# Patient Record
Sex: FEMALE | Race: WHITE | URBAN - METROPOLITAN AREA
[De-identification: names, ages, dates, MRNs, and addresses within clinical notes are randomized per-mention and may not be internally consistent; named-entity substitution may affect disease eponyms.]

---

## 2018-11-30 ENCOUNTER — OFFICE VISIT (OUTPATIENT)
Dept: URBAN - METROPOLITAN AREA CLINIC 71 | Facility: CLINIC | Age: 78
End: 2018-11-30
Payer: COMMERCIAL

## 2018-11-30 DIAGNOSIS — H43.813 VITREOUS DEGENERATION, BILATERAL: ICD-10-CM

## 2018-11-30 PROCEDURE — 99204 OFFICE O/P NEW MOD 45 MIN: CPT | Performed by: OPTOMETRIST

## 2018-11-30 ASSESSMENT — INTRAOCULAR PRESSURE
OD: 20
OS: 20

## 2018-11-30 NOTE — IMPRESSION/PLAN
Impression: Dry eye syndrome of bilateral lacrimal glands: H04.123. likely cause of fluctuating blur. No PCO OU. Open PC OU. Eyes worse in AM. Plan: Blink more. ATs QID OU. Genteal Gel Severe. Call if worsens.

## 2018-11-30 NOTE — IMPRESSION/PLAN
Impression: Vitreous degeneration, bilateral: H43.813. Syneresis may also cause vision problems Plan: Discussed. No tx recommended.  observe

## 2020-07-16 ENCOUNTER — OFFICE VISIT (OUTPATIENT)
Dept: URBAN - METROPOLITAN AREA CLINIC 71 | Facility: CLINIC | Age: 80
End: 2020-07-16
Payer: MEDICARE

## 2020-07-16 DIAGNOSIS — H11.32 SUBCONJUNCTIVAL HEMORRHAGE OF LEFT EYE: Primary | ICD-10-CM

## 2020-07-16 DIAGNOSIS — Z96.1 PRESENCE OF INTRAOCULAR LENS: ICD-10-CM

## 2020-07-16 DIAGNOSIS — H04.123 DRY EYE SYNDROME OF BILATERAL LACRIMAL GLANDS: ICD-10-CM

## 2020-07-16 PROCEDURE — 92014 COMPRE OPH EXAM EST PT 1/>: CPT | Performed by: OPTOMETRIST

## 2020-07-16 ASSESSMENT — INTRAOCULAR PRESSURE
OD: 17
OS: 17

## 2020-07-16 NOTE — IMPRESSION/PLAN
Impression: Dry eye syndrome of bilateral lacrimal glands: H04.123. likely cause of fluctuating blur. Eyes worse in AM. Plan: Blink more. ATs QID OU. Genteal Gel Severe. Call if worsens. Consider sleeping mask/goggles.

## 2022-04-25 ENCOUNTER — OFFICE VISIT (OUTPATIENT)
Dept: URBAN - METROPOLITAN AREA CLINIC 71 | Facility: CLINIC | Age: 82
End: 2022-04-25
Payer: MEDICARE

## 2022-04-25 DIAGNOSIS — H16.213 EXPOSURE KERATOCONJUNCTIVITIS, BILATERAL: Primary | ICD-10-CM

## 2022-04-25 DIAGNOSIS — H43.813 VITREOUS DEGENERATION, BILATERAL: ICD-10-CM

## 2022-04-25 DIAGNOSIS — Z96.1 PRESENCE OF INTRAOCULAR LENS: ICD-10-CM

## 2022-04-25 PROCEDURE — 92014 COMPRE OPH EXAM EST PT 1/>: CPT | Performed by: OPTOMETRIST

## 2022-04-25 ASSESSMENT — INTRAOCULAR PRESSURE
OS: 22
OD: 18

## 2022-04-25 NOTE — IMPRESSION/PLAN
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 OS>OD. Likely cause of fluctuating blur. Eyes worse in AM, or when reading for extended period. Plan: Discussed good blinking habits, especially with extended near/computer work. Use ATs up to QID OU. Recommend Genteal Gel Severe before bed OU. Consider sleeping mask/goggles. Recommend Tranquileyes or similar sleeping mask. Call if worsens.

## 2022-04-25 NOTE — IMPRESSION/PLAN
Impression: Vitreous degeneration, bilateral: H43.813. Stable OU, no holes or tears observed on today's exam Plan: Discussed. No tx recommended.  Continue to monitor with yearly DE.

## 2022-04-25 NOTE — IMPRESSION/PLAN
Impression: Presence of intraocular lens: Z96.1. s/p YAG OU. Restor IOL OU Plan: Continue to observe.

## 2023-04-27 ENCOUNTER — OFFICE VISIT (OUTPATIENT)
Dept: URBAN - METROPOLITAN AREA CLINIC 71 | Facility: CLINIC | Age: 83
End: 2023-04-27
Payer: MEDICARE

## 2023-04-27 DIAGNOSIS — Z96.1 PRESENCE OF INTRAOCULAR LENS: ICD-10-CM

## 2023-04-27 DIAGNOSIS — H16.213 EXPOSURE KERATOCONJUNCTIVITIS, BILATERAL: ICD-10-CM

## 2023-04-27 DIAGNOSIS — H40.013 OPEN ANGLE WITH BORDERLINE FINDINGS, LOW RISK, BILATERAL: ICD-10-CM

## 2023-04-27 DIAGNOSIS — H43.813 VITREOUS DEGENERATION, BILATERAL: Primary | ICD-10-CM

## 2023-04-27 PROCEDURE — 76514 ECHO EXAM OF EYE THICKNESS: CPT | Performed by: OPTOMETRIST

## 2023-04-27 PROCEDURE — 92133 CPTRZD OPH DX IMG PST SGM ON: CPT | Performed by: OPTOMETRIST

## 2023-04-27 PROCEDURE — 92014 COMPRE OPH EXAM EST PT 1/>: CPT | Performed by: OPTOMETRIST

## 2023-04-27 ASSESSMENT — INTRAOCULAR PRESSURE
OD: 15
OS: 13

## 2023-04-27 NOTE — IMPRESSION/PLAN
Impression: Vitreous degeneration, bilateral: H43.813. PVD stable OU, no holes or tears observed on today's exam. Plan: Discussed. Still no tx recommended.  Continue to monitor with yearly DE.

## 2023-04-27 NOTE — IMPRESSION/PLAN
Impression: Open angle with borderline findings, low risk, bilateral: H40.013. Large CD's and overall nerve appearance OU. Pachs: mildly thin OU. Baseline OCT 04/27/23: thinning OD, WNL OS. Plan: OCT-RNFL and Pachymetry ordered and performed today. Discussed condition in detail. Continue to monitor without treatment at this time. Will have pt return next available for VF 24-2 and IOP check with Dr. Ari Hartley, then repeat OCT/VF in 6 months. May need to initiate treatment at that time. Discussed importance of compliance if treatment is started, and risk of vision loss without proper control.

## 2023-04-27 NOTE — IMPRESSION/PLAN
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 Incomplete blink OU. Likely cause of fluctuating blur. Eyes worse in AM, or when reading for extended period. Plan: Reviewed good blinking habits, especially with extended near/computer work. Use ATs up to QID OU. Recommend Genteal Gel Severe before bed OU. Continue Tranquileyes sleeping mask/goggles. Call if worsens.

## 2023-05-19 ENCOUNTER — OFFICE VISIT (OUTPATIENT)
Dept: URBAN - METROPOLITAN AREA CLINIC 71 | Facility: CLINIC | Age: 83
End: 2023-05-19
Payer: MEDICARE

## 2023-05-19 DIAGNOSIS — Z96.1 PRESENCE OF INTRAOCULAR LENS: ICD-10-CM

## 2023-05-19 DIAGNOSIS — H40.013 OPEN ANGLE WITH BORDERLINE FINDINGS, LOW RISK, BILATERAL: Primary | ICD-10-CM

## 2023-05-19 PROCEDURE — 99213 OFFICE O/P EST LOW 20 MIN: CPT | Performed by: OPTOMETRIST

## 2023-05-19 PROCEDURE — 92083 EXTENDED VISUAL FIELD XM: CPT | Performed by: OPTOMETRIST

## 2023-05-19 ASSESSMENT — INTRAOCULAR PRESSURE
OS: 20
OD: 18
OD: 24
OS: 23

## 2023-05-19 NOTE — IMPRESSION/PLAN
Impression: Open angle with borderline findings, low risk, bilateral: H40.013. Large CD's and overall nerve appearance OU. Pachs: mildly thin OU. Baseline OCT 04/27/23: thinning OD, WNL OS. Baseline VF 05/19/23: WNL OD, but unreliable OU due to FLs and FPs. Plan: Borderline glaucoma, intraocular pressure stable without medication. Continue without medication and observe. Keep appt as scheduled for VF/OCT and IOP check 11/2023.